# Patient Record
Sex: FEMALE | Race: WHITE | NOT HISPANIC OR LATINO | URBAN - METROPOLITAN AREA
[De-identification: names, ages, dates, MRNs, and addresses within clinical notes are randomized per-mention and may not be internally consistent; named-entity substitution may affect disease eponyms.]

---

## 2017-12-09 ENCOUNTER — EMERGENCY (EMERGENCY)
Facility: HOSPITAL | Age: 47
LOS: 0 days | Discharge: HOME | End: 2017-12-09

## 2017-12-09 DIAGNOSIS — S61.411A LACERATION WITHOUT FOREIGN BODY OF RIGHT HAND, INITIAL ENCOUNTER: ICD-10-CM

## 2017-12-09 DIAGNOSIS — S61.431A PUNCTURE WOUND WITHOUT FOREIGN BODY OF RIGHT HAND, INITIAL ENCOUNTER: ICD-10-CM

## 2017-12-09 DIAGNOSIS — Z79.899 OTHER LONG TERM (CURRENT) DRUG THERAPY: ICD-10-CM

## 2017-12-09 DIAGNOSIS — Z23 ENCOUNTER FOR IMMUNIZATION: ICD-10-CM
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
## 2024-11-29 PROBLEM — Z00.00 ENCOUNTER FOR PREVENTIVE HEALTH EXAMINATION: Status: ACTIVE | Noted: 2024-11-29

## 2024-12-23 ENCOUNTER — APPOINTMENT (OUTPATIENT)
Dept: GASTROENTEROLOGY | Facility: CLINIC | Age: 54
End: 2024-12-23
Payer: COMMERCIAL

## 2024-12-23 DIAGNOSIS — Z86.39 PERSONAL HISTORY OF OTHER ENDOCRINE, NUTRITIONAL AND METABOLIC DISEASE: ICD-10-CM

## 2024-12-23 DIAGNOSIS — D50.9 IRON DEFICIENCY ANEMIA, UNSPECIFIED: ICD-10-CM

## 2024-12-23 DIAGNOSIS — K65.8 OTHER PERITONITIS: ICD-10-CM

## 2024-12-23 DIAGNOSIS — Z87.19 PERSONAL HISTORY OF OTHER DISEASES OF THE DIGESTIVE SYSTEM: ICD-10-CM

## 2024-12-23 DIAGNOSIS — Z78.9 OTHER SPECIFIED HEALTH STATUS: ICD-10-CM

## 2024-12-23 PROCEDURE — 99204 OFFICE O/P NEW MOD 45 MIN: CPT | Mod: 95

## 2024-12-23 RX ORDER — LEVOTHYROXINE SODIUM 137 UG/1
TABLET ORAL
Refills: 0 | Status: ACTIVE | COMMUNITY

## 2024-12-23 RX ORDER — LINACLOTIDE 72 UG/1
CAPSULE, GELATIN COATED ORAL
Refills: 0 | Status: ACTIVE | COMMUNITY

## 2024-12-23 RX ORDER — NATALIZUMAB 300 MG/15ML
300 INJECTION INTRAVENOUS
Refills: 0 | Status: ACTIVE | COMMUNITY

## 2025-01-21 ENCOUNTER — OUTPATIENT (OUTPATIENT)
Dept: OUTPATIENT SERVICES | Facility: HOSPITAL | Age: 55
LOS: 1 days | End: 2025-01-21
Payer: COMMERCIAL

## 2025-01-21 VITALS
RESPIRATION RATE: 18 BRPM | WEIGHT: 128.09 LBS | OXYGEN SATURATION: 100 % | HEIGHT: 68 IN | HEART RATE: 65 BPM | DIASTOLIC BLOOD PRESSURE: 63 MMHG | TEMPERATURE: 98 F | SYSTOLIC BLOOD PRESSURE: 98 MMHG

## 2025-01-21 DIAGNOSIS — D50.9 IRON DEFICIENCY ANEMIA, UNSPECIFIED: ICD-10-CM

## 2025-01-21 DIAGNOSIS — Z98.891 HISTORY OF UTERINE SCAR FROM PREVIOUS SURGERY: Chronic | ICD-10-CM

## 2025-01-21 PROCEDURE — 91110 GI TRC IMG INTRAL ESOPH-ILE: CPT

## 2025-01-21 RX ORDER — NATALIZUMAB 300 MG/15ML
300 INJECTION INTRAVENOUS
Refills: 0 | DISCHARGE

## 2025-01-21 NOTE — H&P PST ADULT - ASSESSMENT
54 Female here for VCE will be  given to the patient at  after obtaining informed consent and explaining the risks and benefits.  Instructions were provided to the nurse and the patient

## 2025-01-21 NOTE — ASU PATIENT PROFILE, ADULT - FALL HARM RISK - UNIVERSAL INTERVENTIONS
Academic / Physical School Note &/or Note to Certified Athletic Trainer    November 10, 2023    Patient: Shankar Fowler  YOB: 2006  Age:  16 y.o. Date of visit: 11/10/2023    The above patient was seen in our office recently.   Due to a head injury we recommend:      Educational Accommodations / Lb [place]    The following instructions that are checked apply for this patient:  Area  Requested Accommodations Comments / Clarifications   Attendance  No School      Partial School Day as tolerated by student - emphasis on core subject work     x [employer] Day as tolerated by student      Water bottle in class/snack every 3-4 hours          Breaks x If symptoms appear/worsen during class, allow student to go to quite area or nurse's office; if no improvement after 30 minutes allow dismissal to home      Mandatory Breaks:      x Allow breaks during day as deemed necessary by student or teachers/school personnel          Visual Stimulus x Enlarged print (18 font) copies of textbook material/ assignments     x Pre-printed notes (18 font) or  for class material     x Limited computer, TV screen, Bright screen use     x Allow handwritten assignments (as opposed to typed on a computer)     x Reduce brightness on monitors/screens      Change classroom seating to front of room as necessary     x Allow student to Wear sunglasses/hat in school; seat student away from windows and bright lights          Auditory stimulus  Avoid loud classroom activities      Lunch in a quiet place with a friend, if needed     x Avoid loud classes/places (I.e. music, band, choir, shop class, gym and cafeteria)     x Allow student to use earplugs as needed      Allow class transitions before the bell          School work  Carmela Tanner tasks (I.e. 3 step instructions)      Short Break (5 minutes) between tasks     x Reduce overall amount of in-class work     x [employer] workload (only core or important tasks)/eliminate non-essential work      No homework     x Reduce amount of nightly homework      Will attempt homework, but will stop if symptoms occur      Extra tutoring/assistance requested      May begin make up of essential work          Testing  No testing     x Additional time for testing/untimed testing     x Alternative testing methods: Oral delivery of questions, oral response or scribe     x No more than one test a day      No standardized testing          Educational plan  Student is in need of an IEP and/or 504 plan (for prolonged symptoms lasting more than 3 months, if interfering with academic performance)          Physical activity x No physical exertion/athletics/gym/recess     x Light aerobic non-contact physical activity as tolerated     x May begin return to play        Physical Activity / Return-To-Play Protocol    The following instructions that are checked apply for this patient:   Only participate at activity level indicated in the table below. x May progress through RTP up to step 4. Please see table below. Please inform regarding progression / symptoms after reaching Step 4. Graded concussion Return to Play protocol. Please see table below:       x 1)  Symptom limited activity - daily activities that do not exacerbate symptoms (e.g. walking) Target Heart Rate: 30-40% of maximum exertion e.g. slow walking or stationary bike (15 minutes)    2) Aerobic exercise - no symptoms     2A - Light (up to approximately 55% maxHR) then    2B - Moderate (up to approximately 70% maxHR)   Stationary cycling or walking at slow to medium pace.  May start light resistance training that does not result in symptom exacerbation      3)  Individual sport-specific exercise  Note: If sport-specific training involves any risk of inadvertent head impact, medical clearance should occur prior to step 3 Sport specific training away from team environment (eg, running, change of direction and/or individual training drills away from team environment). No activities at risk of head impact. Steps 4-6 should begin after the resolution of any symptoms, abnormalities in cognitive function and any other clinical findings related to the current concussion, including with and after physical exertion. Administer  neurocognitive test if indicated and inform treating physician/ upload test results for review. 4) Non-contact training drills Exercise to high intensity including more challenging training drills (eg passing drills, multiplayer training) can integrate into a team environment. 5) Full contact practice Participate in normal training activities    6) Return to sport Normal game play     ** If symptoms occur at any level, drop back to prior level. **    Please perform IMPACT neurocognitive test on:  n/a    If performing ImPACT neurocognitive Test:    - Include normative values and baseline test scores in the report. Administer post-test symptom questionnaire.   - Advise patient not to engage in heavy physical exertion or exercise for at least 3 hours before taking the test  - Adequate sleep (recommend 8 hours), the night prior to test administration  - Take all routine medications on day of taking test.  - Send a copy of test report with patient for office visit. Patient to return to our office:  in 2 week s    Patient and Parent fully understands and verbally agrees with the above mentioned instructions.     Please contact our office with any questions at:  [phone]     Sincerely,    DO Yohana Anderson Recipients Bed in lowest position, wheels locked, appropriate side rails in place/Call bell, personal items and telephone in reach/Instruct patient to call for assistance before getting out of bed or chair/Non-slip footwear when patient is out of bed/[place] to call system/Physically safe environment - no spills, clutter or unnecessary equipment/Purposeful Proactive Rounding/Room/bathroom lighting operational, light cord in reach

## 2025-01-21 NOTE — ASU DISCHARGE PLAN (ADULT/PEDIATRIC) - NS MD DC FALL RISK RISK
For information on Fall & Injury Prevention, visit: [email or web address] OR  [email or web address] OR  https://www.cdc.gov/steadi/patient.html

## 2025-01-21 NOTE — ASU DISCHARGE PLAN (ADULT/PEDIATRIC) - FINANCIAL ASSISTANCE
[facility] provides services at a reduced cost to those who are determined to be eligible through [facility]’s financial assistance program. Information regarding [facility]’s financial assistance program can be found by going to [email or web address] or by calling [phone].

## 2025-01-21 NOTE — ASU DISCHARGE PLAN (ADULT/PEDIATRIC) - CARE PROVIDER_API CALL
Paula Roy  Gastroenterology  [address] [place] [address]  Phone: [phone]  Fax: [phone]  Established Patient  Follow Up Time: 1 month

## 2025-01-22 DIAGNOSIS — D50.9 IRON DEFICIENCY ANEMIA, UNSPECIFIED: ICD-10-CM

## 2025-02-03 ENCOUNTER — TRANSCRIPTION ENCOUNTER (OUTPATIENT)
Age: 55
End: 2025-02-03

## 2025-02-03 ENCOUNTER — APPOINTMENT (OUTPATIENT)
Dept: GASTROENTEROLOGY | Facility: CLINIC | Age: 55
End: 2025-02-03
Payer: COMMERCIAL

## 2025-02-03 DIAGNOSIS — K59.09 OTHER CONSTIPATION: ICD-10-CM

## 2025-02-03 PROCEDURE — 99214 OFFICE O/P EST MOD 30 MIN: CPT | Mod: 93

## 2025-02-03 RX ORDER — LINACLOTIDE 72 UG/1
72 CAPSULE, GELATIN COATED ORAL
Qty: 30 | Refills: 1 | Status: ACTIVE | COMMUNITY
Start: 2025-02-03 | End: 1900-01-01

## 2025-02-04 ENCOUNTER — TRANSCRIPTION ENCOUNTER (OUTPATIENT)
Age: 55
End: 2025-02-04